# Patient Record
Sex: MALE | Race: WHITE | HISPANIC OR LATINO | Employment: FULL TIME | URBAN - METROPOLITAN AREA
[De-identification: names, ages, dates, MRNs, and addresses within clinical notes are randomized per-mention and may not be internally consistent; named-entity substitution may affect disease eponyms.]

---
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
## 2019-09-05 ENCOUNTER — APPOINTMENT (OUTPATIENT)
Dept: RADIOLOGY | Facility: CLINIC | Age: 19
End: 2019-09-05
Payer: COMMERCIAL

## 2019-09-05 VITALS
HEART RATE: 80 BPM | HEIGHT: 73 IN | SYSTOLIC BLOOD PRESSURE: 122 MMHG | WEIGHT: 315 LBS | DIASTOLIC BLOOD PRESSURE: 82 MMHG | BODY MASS INDEX: 41.75 KG/M2

## 2019-09-05 DIAGNOSIS — M25.512 BILATERAL SHOULDER PAIN, UNSPECIFIED CHRONICITY: ICD-10-CM

## 2019-09-05 DIAGNOSIS — M19.012 OSTEOARTHRITIS OF LEFT AC (ACROMIOCLAVICULAR) JOINT: ICD-10-CM

## 2019-09-05 DIAGNOSIS — M25.511 BILATERAL SHOULDER PAIN, UNSPECIFIED CHRONICITY: ICD-10-CM

## 2019-09-05 DIAGNOSIS — M77.8 LEFT SHOULDER TENDONITIS: ICD-10-CM

## 2019-09-05 DIAGNOSIS — M19.011 OSTEOARTHRITIS OF RIGHT ACROMIOCLAVICULAR JOINT: Primary | ICD-10-CM

## 2019-09-05 DIAGNOSIS — M77.8 RIGHT SHOULDER TENDONITIS: ICD-10-CM

## 2019-09-05 PROCEDURE — 20605 DRAIN/INJ JOINT/BURSA W/O US: CPT | Performed by: PHYSICIAN ASSISTANT

## 2019-09-05 PROCEDURE — 73030 X-RAY EXAM OF SHOULDER: CPT

## 2019-09-05 PROCEDURE — 99213 OFFICE O/P EST LOW 20 MIN: CPT | Performed by: PHYSICIAN ASSISTANT

## 2019-09-05 RX ORDER — LIDOCAINE HYDROCHLORIDE 10 MG/ML
0.5 INJECTION, SOLUTION INFILTRATION; PERINEURAL
Status: COMPLETED | OUTPATIENT
Start: 2019-09-05 | End: 2019-09-05

## 2019-09-05 RX ORDER — NAPROXEN SODIUM 220 MG
220 TABLET ORAL 2 TIMES DAILY WITH MEALS
COMMUNITY
End: 2021-07-16 | Stop reason: HOSPADM

## 2019-09-05 RX ORDER — BETAMETHASONE SODIUM PHOSPHATE AND BETAMETHASONE ACETATE 3; 3 MG/ML; MG/ML
6 INJECTION, SUSPENSION INTRA-ARTICULAR; INTRALESIONAL; INTRAMUSCULAR; SOFT TISSUE
Status: COMPLETED | OUTPATIENT
Start: 2019-09-05 | End: 2019-09-05

## 2019-09-05 RX ADMIN — BETAMETHASONE SODIUM PHOSPHATE AND BETAMETHASONE ACETATE 6 MG: 3; 3 INJECTION, SUSPENSION INTRA-ARTICULAR; INTRALESIONAL; INTRAMUSCULAR; SOFT TISSUE at 14:47

## 2019-09-05 RX ADMIN — LIDOCAINE HYDROCHLORIDE 0.5 ML: 10 INJECTION, SOLUTION INFILTRATION; PERINEURAL at 14:47

## 2019-09-05 NOTE — PROGRESS NOTES
Assessment:     1  Osteoarthritis of right acromioclavicular joint    2  Osteoarthritis of left AC (acromioclavicular) joint    3  Left shoulder tendonitis    4  Right shoulder tendonitis        Plan:     Problem List Items Addressed This Visit        Musculoskeletal and Integument    Osteoarthritis of right acromioclavicular joint - Primary    Relevant Medications    lidocaine (XYLOCAINE) 1 % injection 0 5 mL (Completed)    betamethasone acetate-betamethasone sodium phosphate (CELESTONE) injection 6 mg (Completed)    Other Relevant Orders    XR shoulder 2+ vw left    XR shoulder 2+ vw right    Ambulatory referral to Physical Therapy    Medium joint arthrocentesis: R acromioclavicular (Completed)    Osteoarthritis of left AC (acromioclavicular) joint    Relevant Orders    Ambulatory referral to Physical Therapy    Left shoulder tendonitis    Relevant Orders    Ambulatory referral to Physical Therapy    Right shoulder tendonitis    Relevant Orders    Ambulatory referral to Physical Therapy          The patient was seen and examined by Dr Lupis Akins and myself  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 and bilateral shoulder tendinitis  Findings and treatment options were discussed with the patient  X-rays were reviewed with them  Recommend formal physical therapy at this time  He was given a cortisone injection to the right AC joint  He tolerated the procedure well  Advised to apply cold compress today  Discussed he is to avoid overhead lifting as much as possible  Continue Aleve with food as needed for pain  Follow-up in 6-8 weeks for re-evaluation  All questions were answered to patient's satisfaction  Subjective:     Patient ID: Moira Shannon is a 22 y o  male  Chief Complaint: This is a 60-year-old male complaining of bilateral shoulder pain for the past 2-3 months  The right shoulder hurts more than the left  He denies any injury or change in activities    The pain was gradual in onset  The patient works as a  and feels increased pain at work  He tends to have to do a lot a lifting overhead when lying down  He feels increased pain with these activities  The pain is localized over the superior aspect of his shoulders  He denies any new weakness  He denies any cervical spine pain or radicular symptoms down his upper extremities  He has been taking 1 Aleve a day with no relief  No other treatment  He denies any history of similar symptoms in the past   Patient intake form was reviewed today  Allergy:  Allergies   Allergen Reactions    Cephalexin Hives     Medications:  all current active meds have been reviewed  Past Medical History:  History reviewed  No pertinent past medical history  Past Surgical History:  Past Surgical History:   Procedure Laterality Date    KNEE RECONSTRUCTION, MEDIAL PATELLAR FEMORAL LIGAMENT Left 04/2016     Family History:  Family History   Problem Relation Age of Onset    Diabetes Father     Hypertension Father      Social History:  Social History     Substance and Sexual Activity   Alcohol Use No     Social History     Substance and Sexual Activity   Drug Use No     Social History     Tobacco Use   Smoking Status Never Smoker   Smokeless Tobacco Never Used     Review of Systems   Constitutional: Negative  HENT: Negative  Eyes: Negative  Respiratory: Negative  Cardiovascular: Negative  Gastrointestinal: Negative  Endocrine: Negative  Genitourinary: Negative  Musculoskeletal: Positive for arthralgias  Skin: Negative  Allergic/Immunologic: Negative  Neurological: Negative  Hematological: Negative  Psychiatric/Behavioral: Negative  Objective:  BP Readings from Last 1 Encounters:   09/05/19 122/82      Wt Readings from Last 1 Encounters:   09/05/19 (!) 157 kg (346 lb) (>99 %, Z= 3 51)*     * Growth percentiles are based on CDC (Boys, 2-20 Years) data        BMI:   Estimated body mass index is 45 65 kg/m² as calculated from the following:    Height as of this encounter: 6' 1" (1 854 m)  Weight as of this encounter: 157 kg (346 lb)  BSA:   Estimated body surface area is 2 72 meters squared as calculated from the following:    Height as of this encounter: 6' 1" (1 854 m)  Weight as of this encounter: 157 kg (346 lb)  Physical Exam   Constitutional: He is oriented to person, place, and time  He appears well-developed  HENT:   Head: Normocephalic and atraumatic  Eyes: Conjunctivae and EOM are normal    Neck: Neck supple  Neurological: He is alert and oriented to person, place, and time  Skin: Skin is warm  Psychiatric: He has a normal mood and affect  Nursing note and vitals reviewed  Right Shoulder Exam     Tenderness   The patient is experiencing tenderness in the acromioclavicular joint  Range of Motion   The patient has normal right shoulder ROM  Muscle Strength   The patient has normal right shoulder strength  Tests   Barreto test: positive  Cross arm: positive  Impingement: positive  Drop arm: negative    Other   Erythema: absent  Scars: absent  Sensation: normal  Pulse: present    Comments:  Pain with resisted external rotation and abduction  Positive empty can  Negative speed's      Left Shoulder Exam     Tenderness   The patient is experiencing tenderness in the acromioclavicular joint  Range of Motion   The patient has normal left shoulder ROM  Muscle Strength   The patient has normal left shoulder strength  Tests   Barreto test: negative  Cross arm: positive  Impingement: negative  Drop arm: negative    Other   Erythema: absent  Scars: absent  Sensation: normal  Pulse: present     Comments:  Pain with resisted external rotation and abduction  Positive empty can  Negative speed's            I have personally reviewed pertinent films in PACS and my interpretation is X-rays of the bilateral shoulders reveal osteolysis at the distal clavicles    There is a type 2 acromion       Medium joint arthrocentesis: R acromioclavicular  Date/Time: 9/5/2019 2:47 PM  Consent given by: patient  Site marked: site marked  Timeout: Immediately prior to procedure a time out was called to verify the correct patient, procedure, equipment, support staff and site/side marked as required   Supporting Documentation  Indications: pain   Procedure Details  Location: shoulder - R acromioclavicular  Preparation: Patient was prepped and draped in the usual sterile fashion  Needle size: 25 G  Approach: superior  Medications administered: 6 mg betamethasone acetate-betamethasone sodium phosphate 6 (3-3) mg/mL; 0 5 mL lidocaine 1 %    Patient tolerance: patient tolerated the procedure well with no immediate complications  Dressing:  Sterile dressing applied

## 2019-09-30 ENCOUNTER — EVALUATION (OUTPATIENT)
Dept: PHYSICAL THERAPY | Facility: CLINIC | Age: 19
End: 2019-09-30
Payer: COMMERCIAL

## 2019-09-30 DIAGNOSIS — M19.012 OSTEOARTHRITIS OF LEFT AC (ACROMIOCLAVICULAR) JOINT: ICD-10-CM

## 2019-09-30 DIAGNOSIS — M77.8 LEFT SHOULDER TENDONITIS: ICD-10-CM

## 2019-09-30 DIAGNOSIS — M19.011 OSTEOARTHRITIS OF RIGHT ACROMIOCLAVICULAR JOINT: Primary | ICD-10-CM

## 2019-09-30 DIAGNOSIS — M77.8 RIGHT SHOULDER TENDONITIS: ICD-10-CM

## 2019-09-30 PROCEDURE — 97110 THERAPEUTIC EXERCISES: CPT | Performed by: PHYSICAL THERAPIST

## 2019-09-30 PROCEDURE — 97161 PT EVAL LOW COMPLEX 20 MIN: CPT | Performed by: PHYSICAL THERAPIST

## 2019-09-30 NOTE — PROGRESS NOTES
PT Evaluation     Today's date: 2019  Patient name: Darshan Yao  : 2000  MRN: [id number]  Referring provider: Antonio Pruett PA-C  Dx:   Encounter Diagnosis     ICD-10-CM    1  Osteoarthritis of right acromioclavicular joint M19 011 Ambulatory referral to Physical Therapy   2  Osteoarthritis of left AC (acromioclavicular) joint M19 012 Ambulatory referral to Physical Therapy   3  Left shoulder tendonitis M75 82 Ambulatory referral to Physical Therapy   4  Right shoulder tendonitis M75 81 Ambulatory referral to Physical Therapy                  Assessment  Assessment details: Darshan Yao is a 20 y o  male who presents with pain, decreased strength, decreased ROM, decreased joint mobility and postural  dysfunction  Due to these impairments, patient has difficulty performing a/iadls, recreational activities, work-related activities and engaging in social activities  Patient's clinical presentation is consistent with their referring diagnosis of Osteoarthritis of right acromioclavicular joint, Osteoarthritis of left AC (acromioclavicular) joint, Left shoulder tendonitis, Right shoulder tendonitis  Patient has been educated in home exercise program and plan of care  Patient would benefit from skilled physical therapy services to address their aforementioned functional limitations and progress towards prior level of function and independence with home exercise program    Impairments: abnormal muscle firing, abnormal or restricted ROM, activity intolerance, impaired physical strength, lacks appropriate home exercise program, pain with function and scapular dyskinesis  Understanding of Dx/Px/POC: good   Prognosis: good    Goals  Short Term Goals: Target Date 4 weeks  1  Pt will initiate and advance HEP  2  Pt will have full prom of B shoulders  3  pt will have 0/10 pain at rest  4  Pt will be able to work 1/2 day with out pain      Long Term Goals: Target Date 8 weeks  1   Pt will demonstrate independence in HEP   2  Pt will have full arom   3  Pt will have 0/10 pain with work  4  Pt will be able to lift 25# overhead with out pain      Plan  Patient would benefit from: skilled PT  Planned modality interventions: cryotherapy, electrical stimulation/Russian stimulation and thermotherapy: hydrocollator packs  Planned therapy interventions: joint mobilization, manual therapy, patient education, postural training, activity modification, abdominal trunk stabilization, body mechanics training, flexibility, functional ROM exercises, graded exercise, home exercise program, neuromuscular re-education, strengthening, stretching, therapeutic activities, therapeutic exercise, motor coordination training, muscle pump exercises, gait training, balance/weight bearing training, ADL training and breathing training  Frequency: 1x week  Duration in weeks: 8  Plan of Care beginning date: 2019  Plan of Care expiration date: 2019  Treatment plan discussed with: patient        Subjective Evaluation    History of Present Illness  Mechanism of injury: This is a 60-year-old male complaining of bilateral shoulder pain for the past 3  months  The right shoulder was hurting  more than the left until the cortisone shot, and now the left hurts more  He denies any injury or change in activities  The pain was gradual in onset  The patient works as a  and feels increased pain at work  He feels increased pain with lifting overhead  The pain is localized over the ant/superior aspect of his shoulders  He denies any new weakness  He denies any cervical spine pain or radicular symptoms down his upper extremities  He has been taking 1 Aleve a day with no relief  No other treatment  He denies any history of similar symptoms in the past  Pt also notes increased pain at times when turning his car to the opposite side of his arm     Quality of life: fair    Pain  Current pain ratin  At best pain ratin  At worst pain ratin  Location: B shoulders    Patient Goals  Patient goals for therapy: decreased pain, increased motion, independence with ADLs/IADLs, increased strength and return to sport/leisure activities  Patient goal: pain free driving and work        Objective     Tenderness     Left Shoulder   Tenderness in the [facility] joint and acromion  Right Shoulder  Tenderness in the [facility] joint and acromion  Active Range of Motion   Left Shoulder   Flexion: 155 degrees with pain  Internal rotation BTB: L1     Right Shoulder   Flexion: 155 degrees with pain  Internal rotation BTB: L1     Joint Play   Left Shoulder  Hypomobile in the inferior capsule  Right Shoulder  Hypomobile in the inferior capsule  Strength/Myotome Testing     Left Shoulder     Planes of Motion   Flexion: 4-   Extension: 4+   Abduction: 4+   External rotation at 0°: 3+   Internal rotation at 0°: 4+     Isolated Muscles   Lower trapezius: 3-   Middle trapezius: 4-     Right Shoulder     Planes of Motion   Flexion: 4-   Extension: 4+   Abduction: 4+   External rotation at 0°: 3+   Internal rotation at 0°: 4+     Isolated Muscles   Lower trapezius: 3-   Middle trapezius: 4-     Tests     Left Shoulder   Positive Hawkin's, Neer's and passive horizontal adduction  Right Shoulder   Positive Hawkin's, Neer's and passive horizontal adduction                Precautions: none

## 2019-10-15 ENCOUNTER — OFFICE VISIT (OUTPATIENT)
Dept: PHYSICAL THERAPY | Facility: CLINIC | Age: 19
End: 2019-10-15
Payer: COMMERCIAL

## 2019-10-15 DIAGNOSIS — M19.011 OSTEOARTHRITIS OF RIGHT ACROMIOCLAVICULAR JOINT: Primary | ICD-10-CM

## 2019-10-15 DIAGNOSIS — M77.8 LEFT SHOULDER TENDONITIS: ICD-10-CM

## 2019-10-15 DIAGNOSIS — M19.012 OSTEOARTHRITIS OF LEFT AC (ACROMIOCLAVICULAR) JOINT: ICD-10-CM

## 2019-10-15 PROCEDURE — 97110 THERAPEUTIC EXERCISES: CPT

## 2019-10-15 PROCEDURE — 97140 MANUAL THERAPY 1/> REGIONS: CPT

## 2019-10-15 NOTE — PROGRESS NOTES
Daily Note     Today's date: 10/15/2019  Patient name: Thony Hector  : 2000  MRN: [id number]  Referring provider: Emmy Yost PA-C  Dx:   Encounter Diagnosis     ICD-10-CM    1  Osteoarthritis of right acromioclavicular joint M19 011    2  Osteoarthritis of left AC (acromioclavicular) joint M19 012    3  Left shoulder tendonitis M75 82          Subjective: Pt reports minimal b/l shoulder soreness upon arrival  He states lately the L shoulder has had increased pain compared to R  Objective: See treatment diary below      Assessment: Tolerated treatment well  Pt was introduced to TB exercise and weighted shoulder exercises, good tolerance  Cueing required to engage periscapular musculature with prone exercise  Pt fatigued quickly with prone exercises  No increased pain noted during or post session  Patient would benefit from continued PT, continue to progress as able  Plan: Continue per plan of care      Pt 1:1 with PTA JW 6:00-6:15 6:30-6:45  IEP 5:45-6:00 6:15-6:30     Precautions: none  Manual  10/15            PROM all planes JW                                                                  Exercise Diary  10/15            UBE 3'/'3            TB Row Blck 2x10            TB Ext BTB  2x10            TB IR BTB  2x10            TB ER GTB  2x10            S/l ER/ abd 2# 2x10            Prone Row 6# 2x10            Prone Abd 2# 2x10            Prone scap 1# 2x10                                                                                                                                                               Modalities

## 2019-10-22 ENCOUNTER — TELEPHONE (OUTPATIENT)
Dept: OBGYN CLINIC | Facility: HOSPITAL | Age: 19
End: 2019-10-22

## 2019-10-22 NOTE — TELEPHONE ENCOUNTER
Patient's mom called in and wants to know if the patient can be billed for his follow up co pay  Placed on hold and called the office  Spoke to Zaida Uriostegui and advise  She said as a one time courtesy they will bill the patient  Advise the mom of this information

## 2019-10-24 ENCOUNTER — OFFICE VISIT (OUTPATIENT)
Dept: OBGYN CLINIC | Facility: CLINIC | Age: 19
End: 2019-10-24
Payer: COMMERCIAL

## 2019-10-24 VITALS
HEIGHT: 73 IN | HEART RATE: 82 BPM | BODY MASS INDEX: 41.75 KG/M2 | DIASTOLIC BLOOD PRESSURE: 80 MMHG | WEIGHT: 315 LBS | SYSTOLIC BLOOD PRESSURE: 120 MMHG

## 2019-10-24 DIAGNOSIS — M19.012 OSTEOARTHRITIS OF LEFT AC (ACROMIOCLAVICULAR) JOINT: ICD-10-CM

## 2019-10-24 DIAGNOSIS — M19.011 OSTEOARTHRITIS OF RIGHT ACROMIOCLAVICULAR JOINT: Primary | ICD-10-CM

## 2019-10-24 PROCEDURE — 99213 OFFICE O/P EST LOW 20 MIN: CPT | Performed by: ORTHOPAEDIC SURGERY

## 2019-10-24 NOTE — PROGRESS NOTES
Assessment:     1  Osteoarthritis of right acromioclavicular joint    2  Osteoarthritis of left AC (acromioclavicular) joint        Plan:      Problem List Items Addressed This Visit        Musculoskeletal and Integument    Osteoarthritis of right acromioclavicular joint - Primary    Relevant Orders    MRI shoulder right wo contrast    Osteoarthritis of left AC (acromioclavicular) joint          Patient continues with daily AC joint pain bilateral shoulders right worse than left, CSI offered relief but patient continues to note pain when lifting at or overhead  He is unable to limit this at this time due to his job  Will get MRI to help plan for possible a/SAD, DCE , if he has rotator cuff tear then that would possible be addressed with surgery as well  Try to limit overhead activities  See back to review MRI  All patient's questions were answered to his satisfaction  This note is created using dictation transcription  It may contain typographical errors, grammatical errors, improperly dictated words, background noise and other errors  Subjective:     Patient ID: Sharifa Connell is a 20 y o  male  Chief Complaint:  20 yr old male in for f/u regarding his bilateral shoulders  Treating for [facility] joint arthritis  [facility] right shoulder at last appt 9/5/19 which was helpful  No injury gradual onset of pain, his job requires a lot of overhead lifting which aggravates his shoulders on daily basis  At end of day pain is worse  He is using ibuprofen, tylenol for pain control  Allergy:  Allergies   Allergen Reactions    Cephalexin Hives     Medications:  all current active meds have been reviewed  Past Medical History:  History reviewed  No pertinent past medical history    Past Surgical History:  Past Surgical History:   Procedure Laterality Date    KNEE RECONSTRUCTION, MEDIAL PATELLAR FEMORAL LIGAMENT Left 04/2016     Family History:  Family History   Problem Relation Age of Onset    Diabetes Father     Hypertension Father      Social History:  Social History     Substance and Sexual Activity   Alcohol Use No     Social History     Substance and Sexual Activity   Drug Use No     Social History     Tobacco Use   Smoking Status Never Smoker   Smokeless Tobacco Never Used     Review of Systems   Constitutional: Negative for chills and fever  HENT: Negative for drooling and sneezing  Eyes: Negative for redness  Respiratory: Negative for cough, shortness of breath and wheezing  Cardiovascular: Negative  Gastrointestinal: Negative  Endocrine: Negative  Genitourinary: Negative  Musculoskeletal: Positive for arthralgias (Bilateral shoulder)  Skin: Negative  Neurological: Negative  Hematological: Negative  Psychiatric/Behavioral: Negative  The patient is not nervous/anxious  Objective:  BP Readings from Last 1 Encounters:   10/24/19 120/80      Wt Readings from Last 1 Encounters:   10/24/19 (!) 157 kg (346 lb)      BMI:   Estimated body mass index is 45 65 kg/m² as calculated from the following:    Height as of this encounter: 6' 1" (1 854 m)  Weight as of this encounter: 157 kg (346 lb)  BSA:   Estimated body surface area is 2 72 meters squared as calculated from the following:    Height as of this encounter: 6' 1" (1 854 m)  Weight as of this encounter: 157 kg (346 lb)  Physical Exam   Constitutional: He is oriented to person, place, and time  He appears well-developed and well-nourished  HENT:   Head: Normocephalic and atraumatic  Eyes: Conjunctivae and EOM are normal    Neck: Neck supple  Pulmonary/Chest: Effort normal    Neurological: He is alert and oriented to person, place, and time  He has normal reflexes  Skin: Skin is warm and dry  Psychiatric: He has a normal mood and affect  His behavior is normal  Judgment and thought content normal    Nursing note and vitals reviewed      Right Shoulder Exam     Tenderness   The patient is experiencing tenderness in the acromioclavicular joint  Range of Motion   The patient has normal right shoulder ROM  Active abduction: 170   Passive abduction: 170   External rotation: 80   Forward flexion: 180   Internal rotation 0 degrees: T8     Muscle Strength   Abduction: 5/5   Internal rotation: 5/5   External rotation: 5/5     Tests   Apprehension: negative  Cross arm: positive  Impingement: negative  Drop arm: negative    Other   Erythema: absent  Scars: absent  Sensation: normal  Pulse: present    Comments:  Pain with resisted ABD, ER   Positive empty can        Left Shoulder Exam     Tenderness   The patient is experiencing tenderness in the acromioclavicular joint  Range of Motion   The patient has normal left shoulder ROM    Active abduction: 170   Passive abduction: 170   External rotation: 80   Forward flexion: 180   Internal rotation 0 degrees: T8     Muscle Strength   Abduction: 5/5   Internal rotation: 5/5   External rotation: 5/5     Tests   Apprehension: negative  Cross arm: positive  Impingement: negative  Drop arm: negative    Other   Erythema: absent  Scars: absent  Sensation: normal  Pulse: present     Comments:  Pain with resisted ABD, ER   Positive empty can              no new images to review      Scribe Attestation    I,:   Ainsley Meza am acting as a scribe while in the presence of the attending physician :        I,:   Elissa Monroy MD personally performed the services described in this documentation    as scribed in my presence :

## 2019-11-07 ENCOUNTER — HOSPITAL ENCOUNTER (OUTPATIENT)
Dept: MRI IMAGING | Facility: HOSPITAL | Age: 19
Discharge: HOME | End: 2019-11-07
Attending: ORTHOPAEDIC SURGERY
Payer: COMMERCIAL

## 2019-11-07 DIAGNOSIS — M19.011 OSTEOARTHRITIS OF RIGHT ACROMIOCLAVICULAR JOINT: ICD-10-CM

## 2019-11-07 PROCEDURE — 73221 MRI JOINT UPR EXTREM W/O DYE: CPT

## 2021-01-20 ENCOUNTER — TELEPHONE (OUTPATIENT)
Dept: OBGYN CLINIC | Facility: HOSPITAL | Age: 21
End: 2021-01-20

## 2021-01-20 NOTE — TELEPHONE ENCOUNTER
Patient sees Dr Jagdish De La Cruz  Patients mother is calling because patient is having a lot of pain in his shoulder  He does have an appt scheduled for 2/2 but would like to know what type of medication he can take in the meantime for the pain  Patient uses pharmacy on file        CB: [phone]

## 2021-01-20 NOTE — TELEPHONE ENCOUNTER
Spoke to patient  He has not been seen since 2019  Advised that since we have not seen him in so long the only thing we can recommend is OTC medications as directed on the bottle or ice/heat 20 mins on 20 mins off  Patient did schedule a sooner appointment, however  Patient verbalized understanding

## 2021-01-28 ENCOUNTER — OFFICE VISIT (OUTPATIENT)
Dept: OBGYN CLINIC | Facility: CLINIC | Age: 21
End: 2021-01-28
Payer: COMMERCIAL

## 2021-01-28 VITALS
HEIGHT: 73 IN | DIASTOLIC BLOOD PRESSURE: 70 MMHG | SYSTOLIC BLOOD PRESSURE: 120 MMHG | WEIGHT: 300 LBS | BODY MASS INDEX: 39.76 KG/M2 | TEMPERATURE: 98.8 F

## 2021-01-28 DIAGNOSIS — M19.011 OSTEOARTHRITIS OF RIGHT ACROMIOCLAVICULAR JOINT: Primary | ICD-10-CM

## 2021-01-28 PROCEDURE — 99213 OFFICE O/P EST LOW 20 MIN: CPT | Performed by: ORTHOPAEDIC SURGERY

## 2021-01-28 NOTE — PROGRESS NOTES
Assessment:     1  Osteoarthritis of right acromioclavicular joint        Plan:     Problem List Items Addressed This Visit        Musculoskeletal and Integument    Osteoarthritis of right acromioclavicular joint - Primary      Findings consistent with right acromioclavicular joint osteoarthritis  Discussed findings and treatment options with the patient  I reviewed patient's right shoulder MRI with him  I discussed prognosis of his shoulder condition  Patient had tried conservative treatment and continued to have pain in his shoulder  I recommended surgical intervention for with right shoulder arthroscopic distal clavicle resection  Patient will consider his option and contact me with his decision  All patient's questions were answered to his satisfaction  This note is created using dictation transcription  It may contain typographical errors, grammatical errors, improperly dictated words, background noise and other errors  Subjective:     Patient ID: Tasha Sung is a 23 y o  male  Chief Complaint:    70-year-old gentleman follow-up right shoulder pain  Patient was diagnosed with right acromioclavicular joint osteoarthritis  Patient is here to review his shoulder MRI  He continued to experiencing pain over the top of his right shoulder with activities  Allergy:  Allergies   Allergen Reactions    Cephalexin Hives     Medications:  all current active meds have been reviewed  Past Medical History:  History reviewed  No pertinent past medical history    Past Surgical History:  Past Surgical History:   Procedure Laterality Date    KNEE RECONSTRUCTION, MEDIAL PATELLAR FEMORAL LIGAMENT Left 04/2016     Family History:  Family History   Problem Relation Age of Onset    Diabetes Father     Hypertension Father      Social History:  Social History     Substance and Sexual Activity   Alcohol Use No     Social History     Substance and Sexual Activity   Drug Use No     Social History     Tobacco Use   Smoking Status Never Smoker   Smokeless Tobacco Never Used     Review of Systems   Constitutional: Negative  HENT: Negative  Eyes: Negative  Respiratory: Negative  Cardiovascular: Negative  Gastrointestinal: Negative  Endocrine: Negative  Genitourinary: Negative  Musculoskeletal: Positive for arthralgias (Right shoulde)  Skin: Negative  Neurological: Negative  Hematological: Negative  Psychiatric/Behavioral: Negative  Objective:  BP Readings from Last 1 Encounters:   01/28/21 120/70      Wt Readings from Last 1 Encounters:   01/28/21 136 kg (300 lb)      BMI:   Estimated body mass index is 39 58 kg/m² as calculated from the following:    Height as of this encounter: 6' 1" (1 854 m)  Weight as of this encounter: 136 kg (300 lb)  BSA:   Estimated body surface area is 2 56 meters squared as calculated from the following:    Height as of this encounter: 6' 1" (1 854 m)  Weight as of this encounter: 136 kg (300 lb)  Physical Exam  Vitals signs and nursing note reviewed  Constitutional:       Appearance: Normal appearance  He is well-developed  HENT:      Head: Normocephalic and atraumatic  Right Ear: External ear normal       Left Ear: External ear normal    Eyes:      Extraocular Movements: Extraocular movements intact  Conjunctiva/sclera: Conjunctivae normal    Neck:      Musculoskeletal: Neck supple  Pulmonary:      Effort: Pulmonary effort is normal    Skin:     General: Skin is warm  Neurological:      Mental Status: He is alert and oriented to person, place, and time  Psychiatric:         Mood and Affect: Mood normal          Behavior: Behavior normal        Right Shoulder Exam     Tenderness   The patient is experiencing tenderness in the acromioclavicular joint  Range of Motion   The patient has normal right shoulder ROM  Muscle Strength   The patient has normal right shoulder strength      Tests   Apprehension: negative  Cross arm: positive  Impingement: negative  Drop arm: negative    Other   Erythema: absent  Sensation: normal  Pulse: present            I have personally reviewed pertinent films in PACS and my interpretation is Right shoulder MRI show increased signal activity over acromioclavicular joint with degenerative changes  Rotator cuff and labrum are intact

## 2021-01-28 NOTE — ASSESSMENT & PLAN NOTE
Findings consistent with right acromioclavicular joint osteoarthritis  Discussed findings and treatment options with the patient  I reviewed patient's right shoulder MRI with him  I discussed prognosis of his shoulder condition  Patient had tried conservative treatment and continued to have pain in his shoulder  I recommended surgical intervention for with right shoulder arthroscopic distal clavicle resection  Patient will consider his option and contact me with his decision  All patient's questions were answered to his satisfaction  This note is created using dictation transcription  It may contain typographical errors, grammatical errors, improperly dictated words, background noise and other errors

## 2021-02-09 ENCOUNTER — OFFICE VISIT (OUTPATIENT)
Dept: OBGYN CLINIC | Facility: CLINIC | Age: 21
End: 2021-02-09
Payer: COMMERCIAL

## 2021-02-09 VITALS
BODY MASS INDEX: 40.16 KG/M2 | WEIGHT: 303 LBS | HEIGHT: 73 IN | TEMPERATURE: 97.8 F | DIASTOLIC BLOOD PRESSURE: 70 MMHG | SYSTOLIC BLOOD PRESSURE: 116 MMHG

## 2021-02-09 DIAGNOSIS — M19.011 OSTEOARTHRITIS OF RIGHT ACROMIOCLAVICULAR JOINT: Primary | ICD-10-CM

## 2021-02-09 PROCEDURE — 99213 OFFICE O/P EST LOW 20 MIN: CPT | Performed by: ORTHOPAEDIC SURGERY

## 2021-02-09 PROCEDURE — 20605 DRAIN/INJ JOINT/BURSA W/O US: CPT | Performed by: ORTHOPAEDIC SURGERY

## 2021-02-09 RX ORDER — BETAMETHASONE SODIUM PHOSPHATE AND BETAMETHASONE ACETATE 3; 3 MG/ML; MG/ML
6 INJECTION, SUSPENSION INTRA-ARTICULAR; INTRALESIONAL; INTRAMUSCULAR; SOFT TISSUE
Status: COMPLETED | OUTPATIENT
Start: 2021-02-09 | End: 2021-02-09

## 2021-02-09 RX ORDER — LIDOCAINE HYDROCHLORIDE 10 MG/ML
0.5 INJECTION, SOLUTION INFILTRATION; PERINEURAL
Status: COMPLETED | OUTPATIENT
Start: 2021-02-09 | End: 2021-02-09

## 2021-02-09 RX ADMIN — LIDOCAINE HYDROCHLORIDE 0.5 ML: 10 INJECTION, SOLUTION INFILTRATION; PERINEURAL at 08:48

## 2021-02-09 RX ADMIN — BETAMETHASONE SODIUM PHOSPHATE AND BETAMETHASONE ACETATE 6 MG: 3; 3 INJECTION, SUSPENSION INTRA-ARTICULAR; INTRALESIONAL; INTRAMUSCULAR; SOFT TISSUE at 08:48

## 2021-02-09 NOTE — ASSESSMENT & PLAN NOTE
Findings consistent with bilateral shoulder acromioclavicular joint arthrosis, right worse than left  Discussed findings and treatment options with the patient  I discussed prognosis of his shoulder condition  Patient would like to proceed with surgical intervention but in June 2021  He would like to have cortisone injection done today for his right shoulder  He will returned to set up his surgery in June  Cold compress over the injection site today  All patient's questions were answered to his satisfaction  This note is created using dictation transcription  It may contain typographical errors, grammatical errors, improperly dictated words, background noise and other errors

## 2021-02-09 NOTE — PROGRESS NOTES
Assessment:     1  Osteoarthritis of right acromioclavicular joint        Plan:     Problem List Items Addressed This Visit        Musculoskeletal and Integument    Osteoarthritis of right acromioclavicular joint - Primary      Findings consistent with bilateral shoulder acromioclavicular joint arthrosis, right worse than left  Discussed findings and treatment options with the patient  I discussed prognosis of his shoulder condition  Patient would like to proceed with surgical intervention but in June 2021  He would like to have cortisone injection done today for his right shoulder  He will returned to set up his surgery in June  Cold compress over the injection site today  All patient's questions were answered to his satisfaction  This note is created using dictation transcription  It may contain typographical errors, grammatical errors, improperly dictated words, background noise and other errors  Relevant Medications    lidocaine (XYLOCAINE) 1 % injection 0 5 mL (Completed)    betamethasone acetate-betamethasone sodium phosphate (CELESTONE) injection 6 mg (Completed)    Other Relevant Orders    Medium joint arthrocentesis: R acromioclavicular (Completed)         Subjective:     Patient ID: Pari Almaraz is a 23 y o  male  Chief Complaint:  18-year-old gentleman follow-up bilateral shoulder acromioclavicular joint arthrosis  Patient continued to complain of pain over the top of his shoulder, the right side is worse than the left  Patient works as an  and having pain with using his arms , especially overhead position  Patient had MRI of his right shoulder which did not demonstrate any rotator cuff or labral pathology  Patient is here to discuss surgical options for his right shoulder  Allergy:  Allergies   Allergen Reactions    Cephalexin Hives     Medications:  all current active meds have been reviewed  Past Medical History:  History reviewed   No pertinent past medical history  Past Surgical History:  Past Surgical History:   Procedure Laterality Date    KNEE RECONSTRUCTION, MEDIAL PATELLAR FEMORAL LIGAMENT Left 04/2016     Family History:  Family History   Problem Relation Age of Onset    Diabetes Father     Hypertension Father      Social History:  Social History     Substance and Sexual Activity   Alcohol Use No     Social History     Substance and Sexual Activity   Drug Use No     Social History     Tobacco Use   Smoking Status Never Smoker   Smokeless Tobacco Never Used     Review of Systems   Constitutional: Negative  HENT: Negative  Eyes: Negative  Respiratory: Negative  Cardiovascular: Negative  Gastrointestinal: Negative  Endocrine: Negative  Genitourinary: Negative  Musculoskeletal: Positive for arthralgias (Bilateral shoulder)  Skin: Negative  Neurological: Negative  Hematological: Negative  Psychiatric/Behavioral: Negative  Objective:  BP Readings from Last 1 Encounters:   02/09/21 116/70      Wt Readings from Last 1 Encounters:   02/09/21 (!) 137 kg (303 lb)      BMI:   Estimated body mass index is 39 98 kg/m² as calculated from the following:    Height as of this encounter: 6' 1" (1 854 m)  Weight as of this encounter: 137 kg (303 lb)  BSA:   Estimated body surface area is 2 56 meters squared as calculated from the following:    Height as of this encounter: 6' 1" (1 854 m)  Weight as of this encounter: 137 kg (303 lb)  Physical Exam  Vitals signs and nursing note reviewed  Constitutional:       Appearance: Normal appearance  He is well-developed  HENT:      Head: Normocephalic and atraumatic  Right Ear: External ear normal       Left Ear: External ear normal    Eyes:      Extraocular Movements: Extraocular movements intact  Conjunctiva/sclera: Conjunctivae normal    Neck:      Musculoskeletal: Neck supple  Pulmonary:      Effort: Pulmonary effort is normal    Skin:     General: Skin is warm  Neurological:      Mental Status: He is alert and oriented to person, place, and time  Psychiatric:         Mood and Affect: Mood normal          Behavior: Behavior normal        Right Shoulder Exam     Tenderness   The patient is experiencing tenderness in the acromioclavicular joint  Range of Motion   The patient has normal right shoulder ROM  Muscle Strength   The patient has normal right shoulder strength  Tests   Apprehension: negative  Cross arm: positive  Impingement: negative  Drop arm: negative    Other   Erythema: absent  Scars: absent  Sensation: normal  Pulse: present      Left Shoulder Exam     Tenderness   The patient is experiencing tenderness in the acromioclavicular joint  Range of Motion   The patient has normal left shoulder ROM  Muscle Strength   The patient has normal left shoulder strength  Tests   Apprehension: negative  Cross arm: positive  Impingement: negative  Drop arm: negative    Other   Erythema: absent  Scars: absent  Sensation: normal  Pulse: present             No new images for review today     Medium joint arthrocentesis: R acromioclavicular  Universal Protocol:  Consent: Verbal consent not obtained    Risks and benefits: risks, benefits and alternatives were discussed  Consent given by: patient  Patient understanding: patient states understanding of the procedure being performed  Site marked: the operative site was marked  Supporting Documentation  Indications: pain   Procedure Details  Location: shoulder - R acromioclavicular  Preparation: Patient was prepped and draped in the usual sterile fashion  Needle size: 25 G  Ultrasound guidance: no  Approach: superior  Medications administered: 6 mg betamethasone acetate-betamethasone sodium phosphate 6 (3-3) mg/mL; 0 5 mL lidocaine 1 %    Patient tolerance: patient tolerated the procedure well with no immediate complications  Dressing:  Sterile dressing applied

## 2021-06-30 ENCOUNTER — OFFICE VISIT (OUTPATIENT)
Dept: OBGYN CLINIC | Facility: CLINIC | Age: 21
End: 2021-06-30
Payer: COMMERCIAL

## 2021-06-30 VITALS
DIASTOLIC BLOOD PRESSURE: 80 MMHG | HEIGHT: 73 IN | BODY MASS INDEX: 39.89 KG/M2 | WEIGHT: 301 LBS | SYSTOLIC BLOOD PRESSURE: 120 MMHG

## 2021-06-30 DIAGNOSIS — M19.011 OSTEOARTHRITIS OF RIGHT ACROMIOCLAVICULAR JOINT: Primary | ICD-10-CM

## 2021-06-30 DIAGNOSIS — M19.012 OSTEOARTHRITIS OF LEFT AC (ACROMIOCLAVICULAR) JOINT: ICD-10-CM

## 2021-06-30 PROCEDURE — 99214 OFFICE O/P EST MOD 30 MIN: CPT | Performed by: PHYSICIAN ASSISTANT

## 2021-06-30 PROCEDURE — 20605 DRAIN/INJ JOINT/BURSA W/O US: CPT | Performed by: PHYSICIAN ASSISTANT

## 2021-06-30 RX ORDER — LIDOCAINE HYDROCHLORIDE 10 MG/ML
1 INJECTION, SOLUTION INFILTRATION; PERINEURAL
Status: COMPLETED | OUTPATIENT
Start: 2021-06-30 | End: 2021-06-30

## 2021-06-30 RX ORDER — BETAMETHASONE SODIUM PHOSPHATE AND BETAMETHASONE ACETATE 3; 3 MG/ML; MG/ML
6 INJECTION, SUSPENSION INTRA-ARTICULAR; INTRALESIONAL; INTRAMUSCULAR; SOFT TISSUE
Status: COMPLETED | OUTPATIENT
Start: 2021-06-30 | End: 2021-06-30

## 2021-06-30 RX ORDER — CLINDAMYCIN PHOSPHATE 900 MG/50ML
900 INJECTION INTRAVENOUS ONCE
Status: CANCELLED | OUTPATIENT
Start: 2021-07-16 | End: 2021-06-30

## 2021-06-30 RX ORDER — CHLORHEXIDINE GLUCONATE 4 G/100ML
SOLUTION TOPICAL DAILY PRN
Status: CANCELLED | OUTPATIENT
Start: 2021-06-30

## 2021-06-30 RX ORDER — SODIUM CHLORIDE, SODIUM LACTATE, POTASSIUM CHLORIDE, CALCIUM CHLORIDE 600; 310; 30; 20 MG/100ML; MG/100ML; MG/100ML; MG/100ML
100 INJECTION, SOLUTION INTRAVENOUS CONTINUOUS
Status: CANCELLED | OUTPATIENT
Start: 2021-07-16

## 2021-06-30 RX ADMIN — LIDOCAINE HYDROCHLORIDE 1 ML: 10 INJECTION, SOLUTION INFILTRATION; PERINEURAL at 16:13

## 2021-06-30 RX ADMIN — BETAMETHASONE SODIUM PHOSPHATE AND BETAMETHASONE ACETATE 6 MG: 3; 3 INJECTION, SUSPENSION INTRA-ARTICULAR; INTRALESIONAL; INTRAMUSCULAR; SOFT TISSUE at 16:13

## 2021-06-30 NOTE — H&P (VIEW-ONLY)
20 y o male presents for bilateral AC joint pain and arthritis  Patient was going to have surgery on his right [facility] joint prior but decided to wait until June  Presents today for follow-up and surgical scheduling  He is a  and works overhead is having difficulty working overhead  MRI which was negative for rotator cuff pathology  He states his left is actually bothering him more than his right at current  Review of Systems  Review of systems negative unless otherwise specified in HPI    Past Medical History  History reviewed  No pertinent past medical history  Past Surgical History  Past Surgical History:   Procedure Laterality Date    KNEE RECONSTRUCTION, MEDIAL PATELLAR FEMORAL LIGAMENT Left 04/2016     Current Medications  Current Outpatient Medications on File Prior to Visit   Medication Sig Dispense Refill    acetaminophen (TYLENOL) 650 mg CR tablet Take 1 tablet (650 mg total) by mouth every 8 (eight) hours as needed for mild pain (Patient not taking: Reported on 9/5/2019) 30 tablet 0    cyclobenzaprine (FLEXERIL) 5 mg tablet Take 1 tablet (5 mg total) by mouth 3 (three) times a day as needed for muscle spasms for up to 3 days 9 tablet 0    ibuprofen (MOTRIN) 600 mg tablet Take 1 tablet (600 mg total) by mouth every 6 (six) hours as needed for mild pain for up to 7 days 28 tablet 0    naproxen sodium (ALEVE) 220 MG tablet Take 220 mg by mouth 2 (two) times a day with meals       No current facility-administered medications on file prior to visit  Recent Labs [facility])  No results found for: HCT, HGB, WBC, PT, INR, ESR, CRP, GLUCOSE, HGBA1C    Physical exam  Body mass index is 39 71 kg/m²     · General: Awake, Alert, Oriented  · Eyes: Pupils equal, round and reactive to light  · Heart: regular rate and rhythm without murmur  · Lungs: CTA without wheeze  · Abdomen: soft, nontender  right Shoulder  · Positive pain with palpation to the right AC joint and left AC joint  Date forward flexion 160° before start of pain  Left forward flexion approximately 150°  Grossly neurovascular intact to bilateral upper extremities with radial pulse 4/4  Positive cross chest maneuver both shoulders  Procedure  None today    Imaging  None today but previous x-rays and MRI reviewed noting AC joint arthritis  1  Osteoarthritis of right acromioclavicular joint    2  Osteoarthritis of left AC (acromioclavicular) joint      Assessment:  right shoulder AC joint arthritis  Medium joint arthrocentesis: L acromioclavicular  Universal Protocol:  Consent: Verbal consent obtained  Risks and benefits: risks, benefits and alternatives were discussed  Timeout called at: 6/30/2021 4:10 PM   Patient understanding: patient states understanding of the procedure being performed  Site marked: the operative site was marked  Patient identity confirmed: verbally with patient    Supporting Documentation  Indications: pain   Procedure Details  Location: shoulder (Left AC joint) - L acromioclavicular  Needle size: 22 G  Ultrasound guidance: no  Approach: superior  Medications administered: 1 mL lidocaine 1 %; 6 mg betamethasone acetate-betamethasone sodium phosphate 6 (3-3) mg/mL        Plan:  Patient was last seen February 2021 when surgery was discussed with the right [facility] joint  Patient want to wait until June to have surgery  Patient presents today for H&P and follow-up  Surgical indications and risks were discussed with the patient in full by Dr Tomy Saenz  Consent was obtained for arthroscopic right [facility] joint resection  Patient was given a cortisone injection to the left [facility] joint tolerated well  He will apply ice 20 minutes 1-2 times that area this evening  Patient will not eat after midnight on the night prior to his surgery  This note was created with voice recognition software  Patient

## 2021-06-30 NOTE — PATIENT INSTRUCTIONS
Patient was last seen February 2021 when surgery was discussed with the right [facility] joint  Patient want to wait until June to have surgery  Patient presents today for H&P and follow-up  Surgical indications and risks were discussed with the patient in full by Dr Americo Dawkins  Consent was obtained for arthroscopic right [facility] joint resection  Patient was given a cortisone injection to the left [facility] joint tolerated well  He will apply ice 20 minutes 1-2 times that area this evening  Patient will not eat after midnight on the night prior to his surgery        No outpatient medications have been marked as taking for the 6/30/21 encounter (Office Visit) with Magi Portillo MD

## 2021-06-30 NOTE — ASSESSMENT & PLAN NOTE
Findings consistent with right acromioclavicular joint osteoarthritis  Discussed findings and treatment options with the patient  Patient elected to proceed with surgical interventions with right shoulder arthroscopy distal clavicle resection  We will schedule patient as outpatient procedure  All patient's questions were answered to his satisfaction  This note is created using dictation transcription  It may contain typographical errors, grammatical errors, improperly dictated words, background noise and other errors  Discussed with patient surgical risks and complications including but not limited to infection, persistent pain, nerve and vessel injury, complications associated with anesthesia, DVT, exposure to COVID virus, etc   Patient understands the risks and complication and consented to the surgery

## 2021-06-30 NOTE — PROGRESS NOTES
20 y o male presents for bilateral AC joint pain and arthritis  Patient was going to have surgery on his right [facility] joint prior but decided to wait until June  Presents today for follow-up and surgical scheduling  He is a  and works overhead is having difficulty working overhead  MRI which was negative for rotator cuff pathology  He states his left is actually bothering him more than his right at current  Review of Systems  Review of systems negative unless otherwise specified in HPI    Past Medical History  History reviewed  No pertinent past medical history  Past Surgical History  Past Surgical History:   Procedure Laterality Date    KNEE RECONSTRUCTION, MEDIAL PATELLAR FEMORAL LIGAMENT Left 04/2016     Current Medications  Current Outpatient Medications on File Prior to Visit   Medication Sig Dispense Refill    acetaminophen (TYLENOL) 650 mg CR tablet Take 1 tablet (650 mg total) by mouth every 8 (eight) hours as needed for mild pain (Patient not taking: Reported on 9/5/2019) 30 tablet 0    cyclobenzaprine (FLEXERIL) 5 mg tablet Take 1 tablet (5 mg total) by mouth 3 (three) times a day as needed for muscle spasms for up to 3 days 9 tablet 0    ibuprofen (MOTRIN) 600 mg tablet Take 1 tablet (600 mg total) by mouth every 6 (six) hours as needed for mild pain for up to 7 days 28 tablet 0    naproxen sodium (ALEVE) 220 MG tablet Take 220 mg by mouth 2 (two) times a day with meals       No current facility-administered medications on file prior to visit  Recent Labs [facility])  No results found for: HCT, HGB, WBC, PT, INR, ESR, CRP, GLUCOSE, HGBA1C    Physical exam  Body mass index is 39 71 kg/m²     · General: Awake, Alert, Oriented  · Eyes: Pupils equal, round and reactive to light  · Heart: regular rate and rhythm without murmur  · Lungs: CTA without wheeze  · Abdomen: soft, nontender  right Shoulder  · Positive pain with palpation to the right AC joint and left AC joint  Date forward flexion 160° before start of pain  Left forward flexion approximately 150°  Grossly neurovascular intact to bilateral upper extremities with radial pulse 4/4  Positive cross chest maneuver both shoulders  Procedure  None today    Imaging  None today but previous x-rays and MRI reviewed noting AC joint arthritis  1  Osteoarthritis of right acromioclavicular joint    2  Osteoarthritis of left AC (acromioclavicular) joint      Assessment:  right shoulder AC joint arthritis  Medium joint arthrocentesis: L acromioclavicular  Universal Protocol:  Consent: Verbal consent obtained  Risks and benefits: risks, benefits and alternatives were discussed  Timeout called at: 6/30/2021 4:10 PM   Patient understanding: patient states understanding of the procedure being performed  Site marked: the operative site was marked  Patient identity confirmed: verbally with patient    Supporting Documentation  Indications: pain   Procedure Details  Location: shoulder (Left AC joint) - L acromioclavicular  Needle size: 22 G  Ultrasound guidance: no  Approach: superior  Medications administered: 1 mL lidocaine 1 %; 6 mg betamethasone acetate-betamethasone sodium phosphate 6 (3-3) mg/mL        Plan:  Patient was last seen February 2021 when surgery was discussed with the right [facility] joint  Patient want to wait until June to have surgery  Patient presents today for H&P and follow-up  Surgical indications and risks were discussed with the patient in full by Dr Patricia Samson  Consent was obtained for arthroscopic right [facility] joint resection  Patient was given a cortisone injection to the left [facility] joint tolerated well  He will apply ice 20 minutes 1-2 times that area this evening  Patient will not eat after midnight on the night prior to his surgery  This note was created with voice recognition software

## 2021-07-09 NOTE — PRE-PROCEDURE INSTRUCTIONS
Pre-Surgery Instructions:   Medication Instructions    acetaminophen (TYLENOL) 650 mg CR tablet Instructed patient per Anesthesia Guidelines  prn, may take    cyclobenzaprine (FLEXERIL) 5 mg tablet Instructed patient per Anesthesia Guidelines  prn, may take    You will receive a phone call from hospital for arrival time  Please call surgeons office if any changes in your condition  Wear easy on/off clothing; consider type of surgery;  Valuables, jewelry, piercing's please keep at home  **COVID-19  education/surgical guidelines      Please: No contact lenses or eye make up, artificial eyelashes    Please secure transportation     Follow pre surgery showering or cleaning instructions as  Reviewed by nurse or surgeons office      Questions answered and concerns addressed

## 2021-07-15 ENCOUNTER — ANESTHESIA EVENT (OUTPATIENT)
Dept: PERIOP | Facility: HOSPITAL | Age: 21
End: 2021-07-15
Payer: COMMERCIAL

## 2021-07-15 NOTE — ANESTHESIA PREPROCEDURE EVALUATION
Procedure:  ARTHROSCOPY SHOULDER,  RIGHT DISTAL CLAVICLE RESECTION (Right Shoulder)    Relevant Problems   MUSCULOSKELETAL   (+) Osteoarthritis of left AC (acromioclavicular) joint   (+) Osteoarthritis of right acromioclavicular joint      BMI 39         Anesthesia Plan  ASA Score- 3     Anesthesia Type- general with ASA Monitors  Additional Monitors:   Airway Plan: ETT  Comment: GA with ETT, IV, antiemetics  Right IS nerve block for postop pain control  Plan Factors-    Induction- intravenous      Postoperative Plan-   Planned trial extubation    Informed Consent-

## 2021-07-16 ENCOUNTER — HOSPITAL ENCOUNTER (OUTPATIENT)
Facility: HOSPITAL | Age: 21
Setting detail: OUTPATIENT SURGERY
Discharge: HOME | End: 2021-07-16
Attending: ORTHOPAEDIC SURGERY | Admitting: ORTHOPAEDIC SURGERY
Payer: COMMERCIAL

## 2021-07-16 ENCOUNTER — ANESTHESIA (OUTPATIENT)
Dept: PERIOP | Facility: HOSPITAL | Age: 21
End: 2021-07-16
Payer: COMMERCIAL

## 2021-07-16 VITALS
TEMPERATURE: 98.3 F | BODY MASS INDEX: 38.5 KG/M2 | HEIGHT: 74 IN | HEART RATE: 81 BPM | SYSTOLIC BLOOD PRESSURE: 139 MMHG | WEIGHT: 300 LBS | RESPIRATION RATE: 18 BRPM | DIASTOLIC BLOOD PRESSURE: 69 MMHG | OXYGEN SATURATION: 97 %

## 2021-07-16 DIAGNOSIS — M19.011 OSTEOARTHRITIS OF RIGHT ACROMIOCLAVICULAR JOINT: Primary | ICD-10-CM

## 2021-07-16 PROCEDURE — 29826 SHO ARTHRS SRG DECOMPRESSION: CPT | Performed by: ORTHOPAEDIC SURGERY

## 2021-07-16 PROCEDURE — 29826 SHO ARTHRS SRG DECOMPRESSION: CPT | Performed by: PHYSICIAN ASSISTANT

## 2021-07-16 PROCEDURE — 29824 SHO ARTHRS SRG DSTL CLAVICLC: CPT | Performed by: PHYSICIAN ASSISTANT

## 2021-07-16 PROCEDURE — C9290 INJ, BUPIVACAINE LIPOSOME: HCPCS | Performed by: ANESTHESIOLOGY

## 2021-07-16 PROCEDURE — 29824 SHO ARTHRS SRG DSTL CLAVICLC: CPT | Performed by: ORTHOPAEDIC SURGERY

## 2021-07-16 RX ORDER — DEXMEDETOMIDINE HYDROCHLORIDE 100 UG/ML
INJECTION, SOLUTION INTRAVENOUS AS NEEDED
Status: DISCONTINUED | OUTPATIENT
Start: 2021-07-16 | End: 2021-07-16

## 2021-07-16 RX ORDER — ONDANSETRON 2 MG/ML
INJECTION INTRAMUSCULAR; INTRAVENOUS AS NEEDED
Status: DISCONTINUED | OUTPATIENT
Start: 2021-07-16 | End: 2021-07-16

## 2021-07-16 RX ORDER — OXYCODONE HYDROCHLORIDE AND ACETAMINOPHEN 5; 325 MG/1; MG/1
1 TABLET ORAL EVERY 4 HOURS PRN
Qty: 20 TABLET | Refills: 0 | Status: SHIPPED | OUTPATIENT
Start: 2021-07-16 | End: 2021-07-26

## 2021-07-16 RX ORDER — KETOROLAC TROMETHAMINE 10 MG/1
10 TABLET, FILM COATED ORAL EVERY 8 HOURS
Qty: 15 TABLET | Refills: 0 | Status: SHIPPED | OUTPATIENT
Start: 2021-07-16

## 2021-07-16 RX ORDER — OXYCODONE HYDROCHLORIDE AND ACETAMINOPHEN 5; 325 MG/1; MG/1
1 TABLET ORAL EVERY 4 HOURS PRN
Status: DISCONTINUED | OUTPATIENT
Start: 2021-07-16 | End: 2021-07-16 | Stop reason: HOSPADM

## 2021-07-16 RX ORDER — ACETAMINOPHEN 325 MG/1
650 TABLET ORAL EVERY 6 HOURS PRN
Status: DISCONTINUED | OUTPATIENT
Start: 2021-07-16 | End: 2021-07-16 | Stop reason: HOSPADM

## 2021-07-16 RX ORDER — FENTANYL CITRATE/PF 50 MCG/ML
25 SYRINGE (ML) INJECTION
Status: DISCONTINUED | OUTPATIENT
Start: 2021-07-16 | End: 2021-07-16 | Stop reason: HOSPADM

## 2021-07-16 RX ORDER — FENTANYL CITRATE 50 UG/ML
INJECTION, SOLUTION INTRAMUSCULAR; INTRAVENOUS AS NEEDED
Status: DISCONTINUED | OUTPATIENT
Start: 2021-07-16 | End: 2021-07-16

## 2021-07-16 RX ORDER — BUPIVACAINE HYDROCHLORIDE 2.5 MG/ML
INJECTION, SOLUTION EPIDURAL; INFILTRATION; INTRACAUDAL
Status: COMPLETED | OUTPATIENT
Start: 2021-07-16 | End: 2021-07-16

## 2021-07-16 RX ORDER — CHLORHEXIDINE GLUCONATE 4 G/100ML
SOLUTION TOPICAL DAILY PRN
Status: DISCONTINUED | OUTPATIENT
Start: 2021-07-16 | End: 2021-07-16 | Stop reason: HOSPADM

## 2021-07-16 RX ORDER — ONDANSETRON 2 MG/ML
4 INJECTION INTRAMUSCULAR; INTRAVENOUS EVERY 6 HOURS PRN
Status: DISCONTINUED | OUTPATIENT
Start: 2021-07-16 | End: 2021-07-16 | Stop reason: HOSPADM

## 2021-07-16 RX ORDER — ROCURONIUM BROMIDE 10 MG/ML
INJECTION, SOLUTION INTRAVENOUS AS NEEDED
Status: DISCONTINUED | OUTPATIENT
Start: 2021-07-16 | End: 2021-07-16

## 2021-07-16 RX ORDER — FENTANYL CITRATE 50 UG/ML
INJECTION, SOLUTION INTRAMUSCULAR; INTRAVENOUS
Status: COMPLETED | OUTPATIENT
Start: 2021-07-16 | End: 2021-07-16

## 2021-07-16 RX ORDER — CLINDAMYCIN PHOSPHATE 900 MG/50ML
900 INJECTION INTRAVENOUS ONCE
Status: COMPLETED | OUTPATIENT
Start: 2021-07-16 | End: 2021-07-16

## 2021-07-16 RX ORDER — DEXAMETHASONE SODIUM PHOSPHATE 10 MG/ML
INJECTION, SOLUTION INTRAMUSCULAR; INTRAVENOUS AS NEEDED
Status: DISCONTINUED | OUTPATIENT
Start: 2021-07-16 | End: 2021-07-16

## 2021-07-16 RX ORDER — LIDOCAINE HYDROCHLORIDE 10 MG/ML
INJECTION, SOLUTION EPIDURAL; INFILTRATION; INTRACAUDAL; PERINEURAL AS NEEDED
Status: DISCONTINUED | OUTPATIENT
Start: 2021-07-16 | End: 2021-07-16

## 2021-07-16 RX ORDER — SODIUM CHLORIDE, SODIUM LACTATE, POTASSIUM CHLORIDE, CALCIUM CHLORIDE 600; 310; 30; 20 MG/100ML; MG/100ML; MG/100ML; MG/100ML
100 INJECTION, SOLUTION INTRAVENOUS CONTINUOUS
Status: DISCONTINUED | OUTPATIENT
Start: 2021-07-16 | End: 2021-07-16 | Stop reason: HOSPADM

## 2021-07-16 RX ORDER — MIDAZOLAM HYDROCHLORIDE 2 MG/2ML
INJECTION, SOLUTION INTRAMUSCULAR; INTRAVENOUS
Status: COMPLETED | OUTPATIENT
Start: 2021-07-16 | End: 2021-07-16

## 2021-07-16 RX ORDER — PROPOFOL 10 MG/ML
INJECTION, EMULSION INTRAVENOUS AS NEEDED
Status: DISCONTINUED | OUTPATIENT
Start: 2021-07-16 | End: 2021-07-16

## 2021-07-16 RX ADMIN — ONDANSETRON 4 MG: 2 INJECTION INTRAMUSCULAR; INTRAVENOUS at 08:31

## 2021-07-16 RX ADMIN — LIDOCAINE HYDROCHLORIDE 50 MG: 10 INJECTION, SOLUTION EPIDURAL; INFILTRATION; INTRACAUDAL; PERINEURAL at 07:39

## 2021-07-16 RX ADMIN — MIDAZOLAM 2 MG: 1 INJECTION INTRAMUSCULAR; INTRAVENOUS at 07:28

## 2021-07-16 RX ADMIN — DEXMEDETOMIDINE 8 MCG: 100 INJECTION, SOLUTION, CONCENTRATE INTRAVENOUS at 08:05

## 2021-07-16 RX ADMIN — DEXAMETHASONE SODIUM PHOSPHATE 4 MG: 10 INJECTION, SOLUTION INTRAMUSCULAR; INTRAVENOUS at 08:00

## 2021-07-16 RX ADMIN — BUPIVACAINE HYDROCHLORIDE 7 ML: 2.5 INJECTION, SOLUTION EPIDURAL; INFILTRATION; INTRACAUDAL; PERINEURAL at 07:28

## 2021-07-16 RX ADMIN — FENTANYL CITRATE 50 MCG: 50 INJECTION, SOLUTION INTRAMUSCULAR; INTRAVENOUS at 07:39

## 2021-07-16 RX ADMIN — FENTANYL CITRATE 50 MCG: 50 INJECTION, SOLUTION INTRAMUSCULAR; INTRAVENOUS at 07:28

## 2021-07-16 RX ADMIN — PROPOFOL 400 MG: 10 INJECTION, EMULSION INTRAVENOUS at 07:39

## 2021-07-16 RX ADMIN — CLINDAMYCIN IN 5 PERCENT DEXTROSE 900 MG: 18 INJECTION, SOLUTION INTRAVENOUS at 07:40

## 2021-07-16 RX ADMIN — FENTANYL CITRATE 100 MCG: 50 INJECTION, SOLUTION INTRAMUSCULAR; INTRAVENOUS at 08:04

## 2021-07-16 RX ADMIN — ROCURONIUM BROMIDE 50 MG: 10 INJECTION, SOLUTION INTRAVENOUS at 07:39

## 2021-07-16 RX ADMIN — SODIUM CHLORIDE, SODIUM LACTATE, POTASSIUM CHLORIDE, AND CALCIUM CHLORIDE 100 ML/HR: .6; .31; .03; .02 INJECTION, SOLUTION INTRAVENOUS at 07:03

## 2021-07-16 NOTE — OP NOTE
OPERATIVE REPORT  PATIENT NAME: Antonia Ortega    :  2000  MRN: [id number]  Pt Location:  OR ROOM 03    SURGERY DATE: 2021    Surgeon(s) and Role:     * Bryson Ellis MD - Primary     * Dennie Charnley, PA-C - Assisting    Preop Diagnosis:  Osteoarthritis of right acromioclavicular joint [M19 011]    Post-Op Diagnosis Codes:     * Osteoarthritis of right acromioclavicular joint [M19 011]    Procedure(s) (LRB):  ARTHROSCOPY SHOULDER,  RIGHT DISTAL CLAVICLE RESECTION, SUBACROMIAL DECOMPRESSION (Right)    Specimen(s):  * No specimens in log *    Estimated Blood Loss:   Minimal    Drains:  * No LDAs found *    Anesthesia Type:   LMA, interscalene block    Operative Indications:  Osteoarthritis of right acromioclavicular joint [M19 011]    Indications:  Antonia Ortega is a 23y o  years old male diagnosed with right shoulder AC joint osteoarthritis  Patient failed conservative treatments and elected to proceed with surgical intervention  The risks and complications are discussed with the patient  The patient consented to the procedure  Operating findings:  The patient was brought into the operating room, properly identified, anesthetized with scalene block and LMA without any difficulty  Patient was then placed in lateral decubitus position with right up  The right arm and shoulder was then prepped and draped in a sterile fashion  The right arm was placed on a skin traction device with 10 pound weight, abducted about 30 and forward flexed about 10     3 portals were utilized for Instrumentation  The scope was first introduced into Glenohumeral joint through the posterolateral portal   Inspection of the joint was done showing the patient's labrum appeared to be intact  The biceps anchor appeared to be intact  There was grade 0 glenoid arthritis  There was no loose body in the joint  The rotator cuff appeared to be intact intra-articularly  The fluid was then drained out of the shoulder joint      Attention was then turned to the subacromial space  The scope was redirected into the subacromial space through the posterior portal   The shaver was introduced through the lateral portal and debridement of the bursal tissue was then done  Inspection of the rotator cuff showed that the patient has a intact rotator cuff tear  An acromioplasty was then done  Once the acromioplasty was done, patient's subacromion space appeared to be have more room  The distal clavicle was then resect 0 8 - 1 cm  Bleeding was controlled with the electrocautery Bovie  Excess fluid was then drained out of the subacromial space  The instruments were removed  The incisions were then closed with nylon and sterile bulky dressing was applied  A sling was then placed  Patient tolerated the procedure well without any complication  Patient was then extubated and transferred to the recovery room for post-op care  Family was contacted  There was no qualified resident available to assist     Mrs Afshan Maynard was required in the OR in helping performing the minimal invasive arthroscopic techniques in scope by manipulating the shoulder and clearing the surgical field for better visualization, as well as assisting in utilizing the shaver and control the camera       Complications:   None    Patient Disposition:  PACU     SIGNATURE: Wilmer Marx MD  DATE: July 16, 2021  TIME: 9:35 AM

## 2021-07-16 NOTE — ANESTHESIA PROCEDURE NOTES
Peripheral Block    Patient location during procedure: holding area  Start time: 7/16/2021 7:28 AM  Reason for block: procedure for pain, at surgeon's request and post-op pain management  Staffing  Performed: anesthesiologist   Anesthesiologist: Flores Flynn MD  Preanesthetic Checklist  Completed: patient identified, IV checked, site marked, risks and benefits discussed, surgical consent, monitors and equipment checked, pre-op evaluation and timeout performed  Peripheral Block  Patient position: supine  Prep: ChloraPrep  Patient monitoring: heart rate, cardiac monitor, continuous pulse ox and frequent blood pressure checks  Block type: interscalene  Laterality: right  Injection technique: single-shot  Procedures: ultrasound guided, Ultrasound guidance required for the procedure to increase accuracy and safety of medication placement and decrease risk of complications  Ultrasound permanent image savedbupivacaine (MARCAINE) 0 25 % perineural infiltration, 7 mL  midazolam (VERSED) 2 mg/2 mL IV, 2 mg  fentaNYL 50 mcg/mL IV, 50 mcg  Needle  Needle type: pencil-tip   Needle gauge: 26 G  Needle length: 2 cm    Needle localization: ultrasound guidance  Needle insertion depth: 1 cm  Test dose: negative  Assessment  Injection assessment: incremental injection, local visualized surrounding nerve on ultrasound, no paresthesia on injection and negative aspiration for heme  Paresthesia pain: none  Heart rate change: no  Slow fractionated injection: yes  Post-procedure:  site cleaned  patient tolerated the procedure well with no immediate complications  Additional Notes  Ultrasound guidance  Ultrasound pic in MEDIA section of Epic  20 cc exparel, 7 cc 0 25% bupivacaine

## 2021-07-16 NOTE — INTERVAL H&P NOTE
H&P reviewed  After examining the patient I find no changes in the patients condition since the H&P had been written      Vitals:    07/16/21 0654   BP: 141/65   Pulse: 86   Resp: 20   Temp: 98 8 °F (37 1 °C)   SpO2: 98%

## 2021-07-16 NOTE — ANESTHESIA POSTPROCEDURE EVALUATION
Post-Op Assessment Note    CV Status:  Stable  Pain Score: 0    Pain management: adequate     Mental Status:  Alert and awake   Hydration Status:  Stable   PONV Controlled:  None   Airway Patency:  Patent      Post Op Vitals Reviewed: Yes      Staff: CRNA         No complications documented      BP   151/72   Temp      Pulse  93   Resp   16   SpO2   98

## 2021-07-16 NOTE — DISCHARGE INSTRUCTIONS
POST-OPERATIVE SHOULDER INSTRUCTIONS 1    The principal surgical findings in your shoulder were:    1  Right shoulder AC joint DJD    2  Right shoulder impingement      The following corrective procedures were performed:     1  Arthroscopic acromioplasty with distal clavicle resection      FOLLOW-UP:    You will need an appointment in:   Please call the office at   today  GENERAL INSTRUCTIONS:     · Apply an ice pack to your shoulder for the next 12-24 hours  Bruising may appear on your arm or chest in a few days  · Your sling is only for comfort  You may remove it part-time or completely when you wish  · If you have an upset stomach, take only cool, clear liquids such as Gatorade, Jello, or Ginger ale  If nausea persists more then 24 hours, notify the office  · Low-grade temperature is not uncommon after surgery  However, of your temperature exceeds 101 degrees, please notify the office  · Any prescriptions you received before or after surgery should be filled immediately and taken according to directions on the label  Taking medication with food or with a glass of milk will avoid stomach upset  EXERCISES:     · Move and use your arm as comfort allows in any direction, but does not cause pain  Restrict lifting, pushing, and pulling until seen in the office  · Bend forward at the waist, allowing your operated arm to hang toward the floor  Hold a can of soup or object with similar weight, and swing the arm gently in all directions for 30 seconds  Repeat 4-5 times daily  · Lie on your back with your elbow at your side  Bend your elbow 90 degrees, then rotate the forearm toward and away from your trunk in a waving motion for 30 seconds  · Bend and straighten your elbow alternately for 30 seconds  · Lace your fingers together; with your opposite arm, carry your operated arm overhead as far as possible, then down again  Repeat 10 times     · De the above exercises 4-6 timed daily, always within reasonable comfort range  · For the first 48 hours, inhale deeply and hold your breath for 3 seconds; exhale completely  Repeat 10 times, 4 times daily  · If you smoke, avoid cigarettes for 48 hours  · Check arm pulse of the affected arm, every 2 hours for the first 24 hours and then every 8 hours  Observe and report any signs of loss of circulation of affected arm as well as any redness, excessive swelling, or drainage from incision  BANDAGES:    · Your bandage may show blood stains within 1-12 hours  This is mostly fluid that was used to irrigate your shoulder, slightly stained with blood  It is no cause for concern  However, if your bandage becomes saturated, notify my office right away  · Remove and discard your bandages and yellow gauze after 48 hours  Apply Band-aids, or apply a dry sterile dressing to the wound or, dressing change as discussed post-operatively with orthopedic surgeon and your family  · May shower in 72 hours  Do not soak the incisions  · ABDs are used under the affected arm (for female patients, under the breasts also) to absorb moisture and to prevent skin breakdown  They should be changed daily  · Keep wound dry and clean  CLOTHING:    · Sling/abduction sling may be worn over clothes  · Female patients may start wearing a bra when they feel comfortable  WORK:     · Your comfort should be your guide for returning to work  Avoid overhead positions for 3 weeks, except for your exercises  · Limit lifting, pushing, or pulling according to comfort

## 2021-07-27 NOTE — TELEPHONE ENCOUNTER
Patient sees Dr Belia Levine  Patient's mother calling about a letter she received that the insurance is denying the shoulder surgery  Due to not being medically necessary  She is faxing the letter to the office      Please call to discuss at [phone]

## 2021-07-29 NOTE — TELEPHONE ENCOUNTER
Hello,    Please advise of the following patient can be forced on to the schedule:     Jamin Hoskins   [birth year]  MRN [id number]  # [phone]  Dr Chun Hernandez op    Email sent to [facility]

## 2021-08-02 ENCOUNTER — OFFICE VISIT (OUTPATIENT)
Dept: OBGYN CLINIC | Facility: CLINIC | Age: 21
End: 2021-08-02

## 2021-08-02 VITALS
WEIGHT: 303 LBS | SYSTOLIC BLOOD PRESSURE: 140 MMHG | HEIGHT: 74 IN | DIASTOLIC BLOOD PRESSURE: 82 MMHG | BODY MASS INDEX: 38.89 KG/M2

## 2021-08-02 DIAGNOSIS — M19.012 OSTEOARTHRITIS OF LEFT AC (ACROMIOCLAVICULAR) JOINT: Primary | ICD-10-CM

## 2021-08-02 PROCEDURE — 99024 POSTOP FOLLOW-UP VISIT: CPT | Performed by: PHYSICIAN ASSISTANT

## 2021-08-02 NOTE — PROGRESS NOTES
Assessment/Plan   Diagnoses and all orders for this visit:    Osteoarthritis of left AC (acromioclavicular) joint    - Postop decompression  - Sutures removed and steri-strips placed  - Start PT  - Follow up with Dr Olivia Randle in 6 weeks      Subjective   Patient ID: Bailee Gallegos is a 23 y o  male  Vitals:    08/02/21 1525   BP: 140/82     21yo male comes in for a postop check  He is a patient of Dr Olivia Randle who is sp right shoulder a/ Alfredo CONTRERAS on 7-16-21  He has been doing very well and offers no new complaints today  The pain is well-controlled  The following portions of the patient's history were reviewed and updated as appropriate: allergies, current medications, past family history, past medical history, past social history, past surgical history and problem list     Review of Systems  Ortho Exam  History reviewed  No pertinent past medical history  Past Surgical History:   Procedure Laterality Date    KNEE RECONSTRUCTION, MEDIAL PATELLAR FEMORAL LIGAMENT Left 04/2016    [place] SHLDR ARTHROSCOP,SURG,DIS CLAVICULECTOMY Right 7/16/2021    Procedure: ARTHROSCOPY SHOULDER,  RIGHT DISTAL CLAVICLE RESECTION, SUBACROMIAL DECOMPRESSION;  Surgeon: Bernice Mane MD;  Location: [facility];  Service: Orthopedics     Family History   Problem Relation Age of Onset    Diabetes Father     Hypertension Father      Social History     Occupational History    Not on file   Tobacco Use    Smoking status: Never Smoker    Smokeless tobacco: Never Used   Vaping Use    Vaping Use: Never used   Substance and Sexual Activity    Alcohol use: No    Drug use: No    Sexual activity: Not on file       Review of Systems   Constitutional: Negative  HENT: Negative  Eyes: Negative  Respiratory: Negative  Cardiovascular: Negative  Gastrointestinal: Negative  Endocrine: Negative  Genitourinary: Negative  Musculoskeletal: As below      Allergic/Immunologic: Negative  Neurological: Negative      Hematological: Negative  Psychiatric/Behavioral: Negative  Objective   Physical Exam           · Constitutional: Awake, Alert, Oriented  · Eyes: EOMI  · Psych: Mood and affect appropriate  · Heart: regular rate and rhythm  · Lungs: No audible wheezing  · Abdomen: soft  · Lymph: no lymphedema       right Shoulder:  - Appearance   The three portal sites are healing well with no erythema, induration, drainage, or dehiscence     No swelling, discoloration, deformity, or ecchymosis  - Palpation   No tenderness to palpation of the clavicle, AC joint, biceps tendon, scapula, or proximal humerus  - ROM   Flexion: 100, ER: 90 and IR: L5  - Motor   Not tested due to postop status  - NVI distally

## 2021-08-27 NOTE — TELEPHONE ENCOUNTER
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

## 2021-08-27 NOTE — TELEPHONE ENCOUNTER
Is patient ready to return to drive truck? 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

## 2021-12-09 ENCOUNTER — TELEPHONE (OUTPATIENT)
Dept: OBGYN CLINIC | Facility: HOSPITAL | Age: 21
End: 2021-12-09

## 2022-03-18 ENCOUNTER — TELEPHONE (OUTPATIENT)
Dept: FAMILY MEDICINE CLINIC | Facility: HOSPITAL | Age: 22
End: 2022-03-18

## 2022-04-04 NOTE — TELEPHONE ENCOUNTER
Spoke with patient - no longer see's Dr Kenia Loredo as primary care physician  Please remove her as PCP  Thank you

## 2022-06-19 NOTE — TELEPHONE ENCOUNTER
06/18/22 10:10 PM     Please provide reason for pcp removal - patient has moved and no longer following with pcp/ office OR patient is now following with an external (non [facility]) PCP?     Thank you  Reema Villanueva

## 2022-06-24 NOTE — TELEPHONE ENCOUNTER
06/24/22 12:33 PM     Thank you for your request  Your request has been received, reviewed, and the patient chart updated  The PCP has successfully been removed with a patient attribution note  This message will now be completed      Thank you  Tal Fay

## 2024-04-09 ENCOUNTER — OFFICE VISIT (OUTPATIENT)
Dept: URGENT CARE | Facility: CLINIC | Age: 24
End: 2024-04-09
Payer: COMMERCIAL

## 2024-04-09 VITALS
SYSTOLIC BLOOD PRESSURE: 128 MMHG | TEMPERATURE: 98.9 F | DIASTOLIC BLOOD PRESSURE: 85 MMHG | OXYGEN SATURATION: 99 % | HEART RATE: 90 BPM | RESPIRATION RATE: 18 BRPM

## 2024-04-09 DIAGNOSIS — J02.9 ACUTE PHARYNGITIS, UNSPECIFIED ETIOLOGY: Primary | ICD-10-CM

## 2024-04-09 LAB — S PYO AG THROAT QL: NEGATIVE

## 2024-04-09 PROCEDURE — 99213 OFFICE O/P EST LOW 20 MIN: CPT | Performed by: PHYSICIAN ASSISTANT

## 2024-04-09 PROCEDURE — 87880 STREP A ASSAY W/OPTIC: CPT | Performed by: PHYSICIAN ASSISTANT

## 2024-04-09 RX ORDER — AMOXICILLIN 500 MG/1
500 CAPSULE ORAL EVERY 8 HOURS SCHEDULED
Qty: 30 CAPSULE | Refills: 0 | Status: SHIPPED | OUTPATIENT
Start: 2024-04-09 | End: 2024-04-19

## 2024-04-09 NOTE — LETTER
April 9, 2024     Patient: Tereso Garcia   YOB: 2000   Date of Visit: 4/9/2024       To Whom It May Concern:    It is my medical opinion that Tereso Garcia may return to work on 4/10/2024 .    If you have any questions or concerns, please don't hesitate to call.         Sincerely,        Edgardo Mancia Jr, INES    CC: No Recipients

## 2024-04-09 NOTE — PROGRESS NOTES
[facility]        NAME: Tereso Garcia is a 23 y.o. male  : 2000    MRN: [id number]  DATE: 2024  TIME: 2:46 PM    /85   Pulse 90   Temp 98.9 °F (37.2 °C)   Resp 18   SpO2 99%     Assessment and Plan   Acute pharyngitis, unspecified etiology [J02.9]  1. Acute pharyngitis, unspecified etiology  POCT rapid strepA    amoxicillin (AMOXIL) 500 mg capsule            Patient Instructions       Follow up with PCP in 3-5 days.  Proceed to  ER if symptoms worsen.    Chief Complaint     Chief Complaint   Patient presents with    Sore Throat     Patient has had a sore throat and nasal congestion starting on Saturday          History of Present Illness       Pt with sore throat and nasal congestion for 3-4 days     Sore Throat         Review of Systems   Review of Systems   Constitutional: Negative.    HENT:  Positive for sore throat.    Eyes: Negative.    Respiratory: Negative.     Cardiovascular: Negative.    Gastrointestinal: Negative.    Endocrine: Negative.    Genitourinary: Negative.    Musculoskeletal: Negative.    Skin: Negative.    Allergic/Immunologic: Negative.    Neurological: Negative.    Hematological: Negative.    Psychiatric/Behavioral: Negative.     All other systems reviewed and are negative.        Current Medications       Current Outpatient Medications:     amoxicillin (AMOXIL) 500 mg capsule, Take 1 capsule (500 mg total) by mouth every 8 (eight) hours for 10 days, Disp: 30 capsule, Rfl: 0    acetaminophen (TYLENOL) 650 mg CR tablet, Take 1 tablet (650 mg total) by mouth every 8 (eight) hours as needed for mild pain (Patient not taking: Reported on 2024), Disp: 30 tablet, Rfl: 0    cyclobenzaprine (FLEXERIL) 5 mg tablet, Take 1 tablet (5 mg total) by mouth 3 (three) times a day as needed for muscle spasms for up to 3 days, Disp: 9 tablet, Rfl: 0    ketorolac (TORADOL) 10 mg tablet, Take 1 tablet (10 mg total) by mouth every 8 (eight) hours Continue current therapy. (Patient  not taking: Reported on 4/9/2024), Disp: 15 tablet, Rfl: 0    Current Allergies     Allergies as of 04/09/2024 - Reviewed 04/09/2024   Allergen Reaction Noted    Cephalexin Hives 10/07/2016            The following portions of the patient's history were reviewed and updated as appropriate: allergies, current medications, past family history, past medical history, past social history, past surgical history and problem list.     No past medical history on file.    Past Surgical History:   Procedure Laterality Date    KNEE RECONSTRUCTION, MEDIAL PATELLAR FEMORAL LIGAMENT Left 04/2016    LA SHLDR ARTHROSCOP,SURG,DIS CLAVICULECTOMY Right 7/16/2021    Procedure: ARTHROSCOPY SHOULDER,  RIGHT DISTAL CLAVICLE RESECTION, SUBACROMIAL DECOMPRESSION;  Surgeon: Roberto Alfred MD;  Location:  MAIN OR;  Service: Orthopedics       Family History   Problem Relation Age of Onset    Diabetes Father     Hypertension Father          Medications have been verified.        Objective   /85   Pulse 90   Temp 98.9 °F (37.2 °C)   Resp 18   SpO2 99%        Physical Exam     Physical Exam  Vitals and nursing note reviewed.   Constitutional:       Appearance: He is well-developed and normal weight.   HENT:      Head: Normocephalic and atraumatic.      Right Ear: Tympanic membrane and ear canal normal.      Left Ear: Tympanic membrane and ear canal normal.      Nose: Congestion present.      Mouth/Throat:      Pharynx: Posterior oropharyngeal erythema present.      Tonsils: No tonsillar exudate or tonsillar abscesses.   Eyes:      Conjunctiva/sclera: Conjunctivae normal.      Pupils: Pupils are equal, round, and reactive to light.   Cardiovascular:      Rate and Rhythm: Normal rate and regular rhythm.      Heart sounds: Normal heart sounds.   Pulmonary:      Effort: Pulmonary effort is normal.      Breath sounds: Normal breath sounds.   Abdominal:      Palpations: Abdomen is soft.   Musculoskeletal:      Cervical back: Normal range of  motion and neck supple.   Lymphadenopathy:      Cervical: Cervical adenopathy present.   Skin:     Capillary Refill: Capillary refill takes less than 2 seconds.   Neurological:      Mental Status: He is alert and oriented to person, place, and time.

## (undated) DEVICE — GLOVE SRG BIOGEL ORTHOPEDIC 7.5

## (undated) DEVICE — CLEAR-TRAC THREADED CANNULA WITH                                    OBTURATOR 5 MM X 76 MM, LATEX FREE,                                    BOX OF 10: Brand: CLEAR-TRAC

## (undated) DEVICE — SYRINGE 3ML LL

## (undated) DEVICE — PACK MAJOR ORTHO W/SPLITS PBDS

## (undated) DEVICE — VAPR COOLPULSE 90 ELECTRODE 90 DEGREES SUCTION WITH INTEGRATED HANDPIECE: Brand: VAPR COOLPULSE

## (undated) DEVICE — OCCLUSIVE GAUZE STRIP,3% BISMUTH TRIBROMOPHENATE IN PETROLATUM BLEND: Brand: XEROFORM

## (undated) DEVICE — TUBING ARTHROSCOPIC WAVE  MAIN PUMP

## (undated) DEVICE — ABDOMINAL PAD: Brand: DERMACEA

## (undated) DEVICE — INTENDED FOR TISSUE SEPARATION, AND OTHER PROCEDURES THAT REQUIRE A SHARP SURGICAL BLADE TO PUNCTURE OR CUT.: Brand: BARD-PARKER SAFETY BLADES SIZE 11, STERILE

## (undated) DEVICE — PUDDLE VAC

## (undated) DEVICE — NEEDLE 18 G X 1 1/2

## (undated) DEVICE — GAUZE SPONGES,16 PLY: Brand: CURITY

## (undated) DEVICE — BLADE SHAVER EXCALIBUR 4MM 13CM COOLCUT

## (undated) DEVICE — TUBING SUCTION 5MM X 12 FT

## (undated) DEVICE — CHLORAPREP HI-LITE 26ML ORANGE

## (undated) DEVICE — GLOVE INDICATOR PI UNDERGLOVE SZ 8 BLUE

## (undated) DEVICE — STANDARD SURGICAL GOWN, L: Brand: CONVERTORS

## (undated) DEVICE — GLOVE SRG BIOGEL 7.5

## (undated) DEVICE — FILTER STRAW 1.7

## (undated) DEVICE — SUT FIBERWIRE #2 1/2 CIRCLE T-5 38IN AR-7200

## (undated) DEVICE — NEEDLE SPINAL18G X 3.5 IN QUINCKE

## (undated) DEVICE — DRAPE ISO IRRIGATION POUCH 1016

## (undated) DEVICE — PENCIL ELECTROSURG E-Z CLEAN -0035H

## (undated) DEVICE — BURR  OVAL 4MM 13CM 8 FLUTE COOLCUT

## (undated) DEVICE — SLEEVE SUSPENSION SHOULDER STAR LAT TRAC VELCRO STRAP

## (undated) DEVICE — SUT ETHILON 3-0 PS-1 18 IN 1663H

## (undated) DEVICE — ARTHROSCOPY FLOOR MAT

## (undated) DEVICE — ARM SLING: Brand: DEROYAL